# Patient Record
Sex: FEMALE | URBAN - METROPOLITAN AREA
[De-identification: names, ages, dates, MRNs, and addresses within clinical notes are randomized per-mention and may not be internally consistent; named-entity substitution may affect disease eponyms.]

---

## 2020-11-02 ENCOUNTER — OFFICE VISIT (OUTPATIENT)
Dept: FAMILY MEDICINE | Facility: CLINIC | Age: 55
End: 2020-11-02

## 2020-11-02 VITALS
TEMPERATURE: 98.6 F | WEIGHT: 189 LBS | SYSTOLIC BLOOD PRESSURE: 138 MMHG | OXYGEN SATURATION: 97 % | RESPIRATION RATE: 16 BRPM | HEART RATE: 62 BPM | DIASTOLIC BLOOD PRESSURE: 82 MMHG

## 2020-11-02 DIAGNOSIS — M54.9 BACK PAIN, UNSPECIFIED BACK LOCATION, UNSPECIFIED BACK PAIN LATERALITY, UNSPECIFIED CHRONICITY: Primary | ICD-10-CM

## 2020-11-02 ASSESSMENT — PAIN SCALES - GENERAL: PAINLEVEL: EXTREME PAIN (9)

## 2020-11-02 NOTE — Clinical Note
I have completed my note, please review, add tie in statement and close encounter.     Thanks!     Teresa Jorgensen

## 2020-11-03 NOTE — PATIENT INSTRUCTIONS
Resumen de la Visita    Nombre del Paciente: Blanquita Shah  MRN: [id number]    Fecha de la [place]: 11/02/2020    Dianostico medico principal: Dolor de la espalda de la region de los hombros    Recomendaciones/Instrucciones del Medico: Terapia fisica    Estudios de Laboratorio: N/A    Seguimiento/Resultados: N/A    Referencias e Instrucciones: boleto de terapia fisica dado al paciente      Medico: Chalino Darden MD

## 2020-11-03 NOTE — PROGRESS NOTES
"Blanquita Ndiaye is a 55 year old female presenting to the clinic with upper back and bilateral shoulder pain, which started ~2 months earlier. She describes the pain as a 9/10 and \"heavy\" with the majority of discomfort during daytime hours. Laying down and utilization of pillows helps alleviate the discomfort. She has not used any medications to manage the pain. She consumes 6 oz of coffee daily, with no tobacco, alcohol, or illicit drug use. PHQ-2 score was 0.       SN Irish Julian RN  "

## 2020-11-03 NOTE — PROGRESS NOTES
MEDICINE NOTE    SUBJECTIVE: Blanquita is a 54 y/o female with PMH of hypertension presenting with 2 months of bilateral 9/10 back pain in the trapezius area. She reports no trauma or event associated with this pain. She experienced a similar in December 2019, and stated that massage and acupuncture made it better. Since most recent onset, she reports that laying down to rest improved symptoms, and working at her job or constant movement makes the pain worse. She has not taken any OTC medications to manage pain. Pain does not radiate. She reports no other symptoms affiliated with her shoulder pain. No trauma.  No fever.  No LE weakness of numbness.  No bowel or bladder symptoms.  No history of cancer.    REVIEW OF SYSTEMS:  Gen: no fevers, night sweats or weight change  Eyes: no vision change, diplopia or red eyes  Ears, Noses, Mouth, Throat: no ringing in ears or hearing change, no epistaxis or nasal discharge, no oral lesions, throat clear  Cardiac: no chest pain, palpitations, or pain with walking  Lungs: no dyspnea, cough, or shortness of breath  GI: no nausea, vomiting, diarrhea or constipation, no abdominal pain  : no change in urine, hematuria, or sexual dysfunction  Musculoskeletal: As noted in HPI  Skin: no concerning lesions or moles  Neuro: no loss of strength or sensation, no numbness or tingling, no tremor  Endo: no polyuria or polydipsia, no temperature intolerance  Heme/Lymph: no concerning bumps, no bleeding problems  Allergy: no environmental or drug allergies  Psych: no depression or anxiety    Past Medical History:   Diagnosis Date     Chronic sinusitis  Hypertension        No past surgical history on file.    No family history on file.    Social History     Socioeconomic History     Marital status:      Spouse name: Not on file     Number of children: 3     Years of education: Not on file     Highest education level: Not on file   Occupational History     Not on file   Social Needs      Financial resource strain: Not on file     Food insecurity     Worry: Not on file     Inability: Not on file     Transportation needs     Medical: Not on file     Non-medical: Not on file   Tobacco Use     Smoking status: Never Smoker     Smokeless tobacco: Never Used   Substance and Sexual Activity     Alcohol use: No     Drug use: No     Sexual activity: Yes     Birth control/protection: Condom   Lifestyle     Physical activity     Days per week: Not on file     Minutes per session: Not on file     Stress: Not on file   Relationships     Social connections     Talks on phone: Not on file     Gets together: Not on file     Attends Orthodox service: Not on file     Active member of club or organization: Not on file     Attends meetings of clubs or organizations: Not on file     Relationship status: Not on file     Intimate partner violence     Fear of current or ex partner: Not on file     Emotionally abused: Not on file     Physically abused: Not on file     Forced sexual activity: Not on file   Other Topics Concern     Not on file   Social History Narrative    11/2/2020: Patient was provided with low cost dental resources, food, english language, and chiropractor resources provided [facility]       OBJECTIVE:  Physical Exam:  /82 (BP Location: Right arm, Patient Position: Sitting, Cuff Size: Adult Regular)   Pulse 62   Temp 98.6  F (37  C) (Oral)   Resp 16   Wt 85.7 kg (189 lb)   SpO2 97%   Constitutional: no distress, comfortable, pleasant   Eyes: anicteric, normal extra-ocular movements   Ears, Nose and Throat: tympanic membranes clear, nose clear and free of lesions, throat clear, neck supple with full range of motion, no thyromegaly.   Cardiovascular: regular rate and rhythm, normal S1 and S2, no murmurs, rubs or gallops, peripheral pulses full and symmetric   Respiratory: clear to auscultation, no wheezes or crackles, normal breath sounds   Gastrointestinal: positive bowel sounds, nontender, no  hepatosplenomegaly, no masses   Musculoskeletal: tenderness to palpation of shoulders, tense shoulder and upper back muscles felt, full range of motion, no edema,   Skin: no concerning lesions, no jaundice   Neurological: Mild intention tremor, otherwise cranial nerves intact, normal strength and sensation, reflexes at patella and biceps brisk but normal, normal gait   Psychological: appropriate mood   Lymphatic: no cervical  lymphadenopathy    ASSESSMENT/PLAN:  Due to no trauma or other event during pain onset, diagnosis is MSK tension in trapezius. For care plan, NSAIDS and 6-12 weeks of PT are recommended.       Med Clinician: Teresa Jorgensen MS2  Preceptor: Chalino Darden MD    In supervising the medical student, Teresa Jameel, I repeated the exam documented above.  No signs her back pain is anything other than musculoskeletal and tension related.  I expect physical therapy will provide relief. I have reviewed and verified the student s documentation.  Supervising Provider: Chalino Darden MD

## 2020-11-04 NOTE — PROGRESS NOTES
[facility] Progress Note    Chief complaint: Back pain    Subjective:  Patient presented to clinic with back pain that has been ongoing for the last 2 months.  She came to [facility] because she has gotten acupuncture and massage here in the past, however I believe she was referring to the [facility] clinic that operates in the same space as [facility], just on different nights of the week.    Blanquita reports not taking any medications for the back pain.  She reports not liking to take any medications due to her past reaction to amoxicillin.  The only medication she is currently taking is Lisinopril 5 mg, take one tablet by mouth once daily.  She receives care for her hypertension from her primary care provider, who she follows up with regularly.    Objective:  /82 (BP Location: Right arm, Patient Position: Sitting, Cuff Size: Adult Regular)   Pulse 62   Temp 98.6  F (37  C) (Oral)   Resp 16   Wt 85.7 kg (189 lb)   SpO2 97%       Assessment:     Back pain:  DTP: There is no Drug Therapy Problem. Referring to PT.     Plan:  1. Give patient a PT fast pass to return to clinic next week 11/9/20.    Follow-Up:  None needed from pharmacy.  Patient will return for PT fast pass on 11/9/20.    Pharmacy Clinician: Derrick Taylor  Pharmacy Preceptor: Shabnam Witt PharmD, BCACP    _____________________________  Preceptor Use Only:  In supervising the student, I have reviewed and verified the student's documentation and found it to be correct and complete.   Preceptor Signature: Shabnam Witt PharmD, BCACP